# Patient Record
Sex: FEMALE | Race: OTHER | HISPANIC OR LATINO | URBAN - METROPOLITAN AREA
[De-identification: names, ages, dates, MRNs, and addresses within clinical notes are randomized per-mention and may not be internally consistent; named-entity substitution may affect disease eponyms.]

---

## 2023-01-26 ENCOUNTER — EMERGENCY (EMERGENCY)
Facility: HOSPITAL | Age: 43
LOS: 1 days | Discharge: HOME | End: 2023-01-26
Attending: EMERGENCY MEDICINE
Payer: MEDICAID

## 2023-01-26 VITALS
DIASTOLIC BLOOD PRESSURE: 71 MMHG | OXYGEN SATURATION: 100 % | RESPIRATION RATE: 18 BRPM | SYSTOLIC BLOOD PRESSURE: 114 MMHG | HEART RATE: 96 BPM | TEMPERATURE: 98 F | WEIGHT: 156.09 LBS

## 2023-01-26 VITALS
RESPIRATION RATE: 18 BRPM | TEMPERATURE: 98 F | SYSTOLIC BLOOD PRESSURE: 121 MMHG | OXYGEN SATURATION: 100 % | DIASTOLIC BLOOD PRESSURE: 75 MMHG | HEART RATE: 82 BPM

## 2023-01-26 DIAGNOSIS — Z90.49 ACQUIRED ABSENCE OF OTHER SPECIFIED PARTS OF DIGESTIVE TRACT: Chronic | ICD-10-CM

## 2023-01-26 LAB
ALBUMIN SERPL ELPH-MCNC: 3.6 G/DL — SIGNIFICANT CHANGE UP (ref 3.5–5)
ALP SERPL-CCNC: 68 U/L — SIGNIFICANT CHANGE UP (ref 40–120)
ALT FLD-CCNC: 19 U/L DA — SIGNIFICANT CHANGE UP (ref 10–60)
ANION GAP SERPL CALC-SCNC: 8 MMOL/L — SIGNIFICANT CHANGE UP (ref 5–17)
APPEARANCE UR: CLEAR — SIGNIFICANT CHANGE UP
AST SERPL-CCNC: 11 U/L — SIGNIFICANT CHANGE UP (ref 10–40)
BACTERIA # UR AUTO: ABNORMAL /HPF
BASOPHILS # BLD AUTO: 0.03 K/UL — SIGNIFICANT CHANGE UP (ref 0–0.2)
BASOPHILS NFR BLD AUTO: 0.5 % — SIGNIFICANT CHANGE UP (ref 0–2)
BILIRUB SERPL-MCNC: 0.4 MG/DL — SIGNIFICANT CHANGE UP (ref 0.2–1.2)
BILIRUB UR-MCNC: NEGATIVE — SIGNIFICANT CHANGE UP
BLD GP AB SCN SERPL QL: SIGNIFICANT CHANGE UP
BUN SERPL-MCNC: 7 MG/DL — SIGNIFICANT CHANGE UP (ref 7–18)
CALCIUM SERPL-MCNC: 9 MG/DL — SIGNIFICANT CHANGE UP (ref 8.4–10.5)
CHLORIDE SERPL-SCNC: 108 MMOL/L — SIGNIFICANT CHANGE UP (ref 96–108)
CO2 SERPL-SCNC: 23 MMOL/L — SIGNIFICANT CHANGE UP (ref 22–31)
COLOR SPEC: YELLOW — SIGNIFICANT CHANGE UP
CREAT SERPL-MCNC: 0.7 MG/DL — SIGNIFICANT CHANGE UP (ref 0.5–1.3)
DIFF PNL FLD: ABNORMAL
EGFR: 110 ML/MIN/1.73M2 — SIGNIFICANT CHANGE UP
EOSINOPHIL # BLD AUTO: 0.11 K/UL — SIGNIFICANT CHANGE UP (ref 0–0.5)
EOSINOPHIL NFR BLD AUTO: 1.7 % — SIGNIFICANT CHANGE UP (ref 0–6)
EPI CELLS # UR: ABNORMAL /HPF
GLUCOSE SERPL-MCNC: 104 MG/DL — HIGH (ref 70–99)
GLUCOSE UR QL: NEGATIVE — SIGNIFICANT CHANGE UP
HCG SERPL-ACNC: HIGH MIU/ML
HCT VFR BLD CALC: 39.2 % — SIGNIFICANT CHANGE UP (ref 34.5–45)
HGB BLD-MCNC: 13.3 G/DL — SIGNIFICANT CHANGE UP (ref 11.5–15.5)
IMM GRANULOCYTES NFR BLD AUTO: 0.3 % — SIGNIFICANT CHANGE UP (ref 0–0.9)
KETONES UR-MCNC: NEGATIVE — SIGNIFICANT CHANGE UP
LEUKOCYTE ESTERASE UR-ACNC: ABNORMAL
LYMPHOCYTES # BLD AUTO: 1.39 K/UL — SIGNIFICANT CHANGE UP (ref 1–3.3)
LYMPHOCYTES # BLD AUTO: 21.6 % — SIGNIFICANT CHANGE UP (ref 13–44)
MCHC RBC-ENTMCNC: 31.3 PG — SIGNIFICANT CHANGE UP (ref 27–34)
MCHC RBC-ENTMCNC: 33.9 GM/DL — SIGNIFICANT CHANGE UP (ref 32–36)
MCV RBC AUTO: 92.2 FL — SIGNIFICANT CHANGE UP (ref 80–100)
MONOCYTES # BLD AUTO: 0.6 K/UL — SIGNIFICANT CHANGE UP (ref 0–0.9)
MONOCYTES NFR BLD AUTO: 9.3 % — SIGNIFICANT CHANGE UP (ref 2–14)
NEUTROPHILS # BLD AUTO: 4.3 K/UL — SIGNIFICANT CHANGE UP (ref 1.8–7.4)
NEUTROPHILS NFR BLD AUTO: 66.6 % — SIGNIFICANT CHANGE UP (ref 43–77)
NITRITE UR-MCNC: NEGATIVE — SIGNIFICANT CHANGE UP
NRBC # BLD: 0 /100 WBCS — SIGNIFICANT CHANGE UP (ref 0–0)
PH UR: 5 — SIGNIFICANT CHANGE UP (ref 5–8)
PLATELET # BLD AUTO: 220 K/UL — SIGNIFICANT CHANGE UP (ref 150–400)
POTASSIUM SERPL-MCNC: 3.3 MMOL/L — LOW (ref 3.5–5.3)
POTASSIUM SERPL-SCNC: 3.3 MMOL/L — LOW (ref 3.5–5.3)
PROT SERPL-MCNC: 8 G/DL — SIGNIFICANT CHANGE UP (ref 6–8.3)
PROT UR-MCNC: 30 MG/DL
RAPID RVP RESULT: DETECTED
RBC # BLD: 4.25 M/UL — SIGNIFICANT CHANGE UP (ref 3.8–5.2)
RBC # FLD: 11.8 % — SIGNIFICANT CHANGE UP (ref 10.3–14.5)
RBC CASTS # UR COMP ASSIST: ABNORMAL /HPF (ref 0–2)
SARS-COV-2 RNA SPEC QL NAA+PROBE: DETECTED
SODIUM SERPL-SCNC: 139 MMOL/L — SIGNIFICANT CHANGE UP (ref 135–145)
SP GR SPEC: 1.02 — SIGNIFICANT CHANGE UP (ref 1.01–1.02)
UROBILINOGEN FLD QL: NEGATIVE — SIGNIFICANT CHANGE UP
WBC # BLD: 6.45 K/UL — SIGNIFICANT CHANGE UP (ref 3.8–10.5)
WBC # FLD AUTO: 6.45 K/UL — SIGNIFICANT CHANGE UP (ref 3.8–10.5)
WBC UR QL: ABNORMAL /HPF (ref 0–5)

## 2023-01-26 PROCEDURE — 86850 RBC ANTIBODY SCREEN: CPT

## 2023-01-26 PROCEDURE — 86900 BLOOD TYPING SEROLOGIC ABO: CPT

## 2023-01-26 PROCEDURE — 99284 EMERGENCY DEPT VISIT MOD MDM: CPT

## 2023-01-26 PROCEDURE — 85025 COMPLETE CBC W/AUTO DIFF WBC: CPT

## 2023-01-26 PROCEDURE — 87086 URINE CULTURE/COLONY COUNT: CPT

## 2023-01-26 PROCEDURE — 81001 URINALYSIS AUTO W/SCOPE: CPT

## 2023-01-26 PROCEDURE — 76802 OB US < 14 WKS ADDL FETUS: CPT

## 2023-01-26 PROCEDURE — 86901 BLOOD TYPING SEROLOGIC RH(D): CPT

## 2023-01-26 PROCEDURE — 36415 COLL VENOUS BLD VENIPUNCTURE: CPT

## 2023-01-26 PROCEDURE — 76817 TRANSVAGINAL US OBSTETRIC: CPT | Mod: 26,59

## 2023-01-26 PROCEDURE — 0225U NFCT DS DNA&RNA 21 SARSCOV2: CPT

## 2023-01-26 PROCEDURE — 87186 SC STD MICRODIL/AGAR DIL: CPT

## 2023-01-26 PROCEDURE — 84702 CHORIONIC GONADOTROPIN TEST: CPT

## 2023-01-26 PROCEDURE — 80053 COMPREHEN METABOLIC PANEL: CPT

## 2023-01-26 PROCEDURE — 99284 EMERGENCY DEPT VISIT MOD MDM: CPT | Mod: 25

## 2023-01-26 PROCEDURE — 76817 TRANSVAGINAL US OBSTETRIC: CPT

## 2023-01-26 PROCEDURE — 76801 OB US < 14 WKS SINGLE FETUS: CPT | Mod: 26

## 2023-01-26 RX ORDER — ACETAMINOPHEN 500 MG
2 TABLET ORAL
Qty: 40 | Refills: 0
Start: 2023-01-26 | End: 2023-01-30

## 2023-01-26 RX ORDER — ACETAMINOPHEN 500 MG
975 TABLET ORAL ONCE
Refills: 0 | Status: COMPLETED | OUTPATIENT
Start: 2023-01-26 | End: 2023-01-26

## 2023-01-26 RX ORDER — CEPHALEXIN 500 MG
1 CAPSULE ORAL
Qty: 28 | Refills: 0
Start: 2023-01-26 | End: 2023-02-01

## 2023-01-26 RX ORDER — IPRATROPIUM BROMIDE 21 MCG
2 AEROSOL, SPRAY (ML) NASAL
Qty: 1 | Refills: 0
Start: 2023-01-26 | End: 2023-01-29

## 2023-01-26 RX ADMIN — Medication 975 MILLIGRAM(S): at 13:41

## 2023-01-26 RX ADMIN — Medication 975 MILLIGRAM(S): at 14:41

## 2023-01-26 NOTE — ED PROVIDER NOTE - NSFOLLOWUPINSTRUCTIONS_ED_ALL_ED_FT
Has dado positivo por Covid. Debe hacer cuarentena melida 5 días desde el inicio de alma síntomas. Medicamentos para alma síntomas enviados a la farmacia.    También tienes shahab infección del tracto urinario. Antibióticos (cefalexina) enviados a la farmacia. Tómelo según las indicaciones y hasta completar todos los medicamentos.    Sunni un seguimiento con colunga obstetra y ginecólogo dentro de 1 semana. Llame y hágales saber que harlan positivo por covid.    Colunga bebé se veía saludable con un latido cardíaco de 153, midiendo 8 semanas y 4 días con fecha de parto el 3 de septiembre.    Si tiene síntomas que empeoran, ashok dolor en el pecho, SOB, dolor abdominal, sangrado vaginal o cualquier otra inquietud, regrese al departamento de emergencias.    You tested positive for Covid. You must quarantine for 5 days from the onset of your symptoms. Medications for your symptoms sent to the pharmacy.     You also have a urinary tract infection. Antibiotics (cephalexin) sent to the pharmacy. Take as directed and until all of the medications are completed.     Follow up with you OBGYN within 1 week. Call and let them know you tested positive for covid.     Your baby looked healthy with a heartbeat of 153, measuring at 8 weeks and 4 days with a due date of September 3.     If you have worsening symptoms such as chest pain, SOB, abdominal pain, vaginal bleeding or any other concerns return to the emergency department.

## 2023-01-26 NOTE — ED ADULT NURSE NOTE - NSFALLRSKASSESSDT_ED_ALL_ED
26-Jan-2023 13:40 How Severe Is Your Skin Lesion?: mild Has Your Skin Lesion Been Treated?: not been treated Is This A New Presentation, Or A Follow-Up?: Skin Lesion

## 2023-01-26 NOTE — ED PROVIDER NOTE - CARE PLAN
1 Principal Discharge DX:	Abdominal pain in pregnancy  Secondary Diagnosis:	Viral illness   Principal Discharge DX:	Abdominal pain in pregnancy  Secondary Diagnosis:	Viral illness  Secondary Diagnosis:	UTI in pregnancy, first trimester   Principal Discharge DX:	COVID-19 affecting pregnancy in first trimester  Secondary Diagnosis:	Viral illness  Secondary Diagnosis:	Abdominal pain in pregnancy  Secondary Diagnosis:	UTI in pregnancy, first trimester

## 2023-01-26 NOTE — ED PROVIDER NOTE - PATIENT PORTAL LINK FT
You can access the FollowMyHealth Patient Portal offered by [facility] by registering at the following website: [email or web address]. By joining [employer]s FollowMyHealth portal, you will also be able to view your health information using other applications (apps) compatible with our system.

## 2023-01-26 NOTE — ED PROVIDER NOTE - CONDITION AT DISCHARGE:
[] : Resident [FreeTextEntry3] : Patient seen and examined with resident and patient's sister in room. On exam, found to have irregularly irregular rhythm, rate ~96, lungs CTA b/l, anxious appearing. Denied CP, palpitations, SOB or knowledge of any history of A fib or other cardiac conditions. Patient to be sent to  ED for cardio evaluation, initiation of AC and rate control medications, further workup as necessary. Patient and her sister are agreeable to the plan.  Satisfactory

## 2023-01-26 NOTE — ED PROVIDER NOTE - OBJECTIVE STATEMENT
[id number]: 42-year-old female with a history of cholecystectomy, G8, P3, LMP 11/26/2022, presents at approximately 9 weeks pregnant with reports of vaginal bleeding and abdominal pain that began Monday.  Patient reports Monday she had vaginal spotting which has since stopped, reports that the abdominal pain persists.  Patient also reporting sore throat, cough, runny nose, and ear pain that began Monday.  Patient also reporting dysuria and urinary frequency.  Has not taken any medications for the symptoms.  Patient denies fevers, nausea, vomiting, diarrhea.

## 2023-01-26 NOTE — ED PROVIDER NOTE - NS ED ATTENDING STATEMENT MOD
This was a shared visit with the LUANNE. I reviewed and verified the documentation and independently performed the documented:

## 2023-01-26 NOTE — ED PROVIDER NOTE - CLINICAL SUMMARY MEDICAL DECISION MAKING FREE TEXT BOX
42-year-old female approximately 9 weeks pregnant with abdominal pain and vaginal spotting.  Will obtain labs, urine, ultrasound to evaluate pregnancy.  Will obtain RVP for viral symptoms.

## 2023-01-26 NOTE — ED PROVIDER NOTE - ENMT, MLM
Airway patent, Nasal mucosa clear. Mouth with normal mucosa. Throat has no vesicles, no oropharyngeal exudates and uvula is midline. +post nasal drip. b/L TM normal
